# Patient Record
Sex: FEMALE | URBAN - METROPOLITAN AREA
[De-identification: names, ages, dates, MRNs, and addresses within clinical notes are randomized per-mention and may not be internally consistent; named-entity substitution may affect disease eponyms.]

---

## 2023-06-28 ENCOUNTER — HOSPITAL ENCOUNTER (OUTPATIENT)
Facility: CLINIC | Age: 28
Discharge: HOME | End: 2023-06-28
Attending: SPECIALIST | Admitting: SPECIALIST
Payer: COMMERCIAL

## 2023-06-28 ENCOUNTER — HOSPITAL ENCOUNTER (OUTPATIENT)
Facility: CLINIC | Age: 28
End: 2023-06-28
Admitting: SPECIALIST

## 2023-06-28 VITALS — SYSTOLIC BLOOD PRESSURE: 107 MMHG | RESPIRATION RATE: 16 BRPM | DIASTOLIC BLOOD PRESSURE: 59 MMHG | TEMPERATURE: 98.1 F

## 2023-06-28 PROBLEM — Z36.89 ENCOUNTER FOR TRIAGE IN PREGNANT PATIENT: Status: ACTIVE | Noted: 2023-06-28

## 2023-06-28 LAB
ALBUMIN UR-MCNC: NEGATIVE MG/DL
AMPHETAMINES UR QL SCN: NORMAL
APPEARANCE UR: CLEAR
BARBITURATES UR QL SCN: NORMAL
BENZODIAZ UR QL SCN: NORMAL
BILIRUB UR QL STRIP: NEGATIVE
BZE UR QL SCN: NORMAL
CANNABINOIDS UR QL SCN: NORMAL
COLOR UR AUTO: NORMAL
GLUCOSE UR STRIP-MCNC: NEGATIVE MG/DL
HGB UR QL STRIP: NEGATIVE
KETONES UR STRIP-MCNC: NEGATIVE MG/DL
LEUKOCYTE ESTERASE UR QL STRIP: NEGATIVE
NITRATE UR QL: NEGATIVE
OPIATES UR QL SCN: NORMAL
PCP QUAL URINE (ROCHE): NORMAL
PH UR STRIP: 5.5 [PH] (ref 5–7)
SP GR UR STRIP: 1.01 (ref 1–1.03)
UROBILINOGEN UR STRIP-MCNC: NORMAL MG/DL

## 2023-06-28 PROCEDURE — G0463 HOSPITAL OUTPT CLINIC VISIT: HCPCS

## 2023-06-28 PROCEDURE — 80307 DRUG TEST PRSMV CHEM ANLYZR: CPT | Performed by: SPECIALIST

## 2023-06-28 PROCEDURE — 81003 URINALYSIS AUTO W/O SCOPE: CPT | Mod: XU | Performed by: SPECIALIST

## 2023-06-28 PROCEDURE — 87086 URINE CULTURE/COLONY COUNT: CPT | Performed by: SPECIALIST

## 2023-06-28 RX ORDER — LIDOCAINE 40 MG/G
CREAM TOPICAL
Status: DISCONTINUED | OUTPATIENT
Start: 2023-06-28 | End: 2023-06-28 | Stop reason: HOSPADM

## 2023-06-28 ASSESSMENT — ACTIVITIES OF DAILY LIVING (ADL)
ADLS_ACUITY_SCORE: 18
ADLS_ACUITY_SCORE: 18

## 2023-06-28 NOTE — PLAN OF CARE
Kyler presents to [facility] ambulatory unaccompanied at 1805. Patient states reason for assessment is abdominal pain. Pt is a  patient at 16+1 weeks, history is significant for drug abuse (clean since 2023) with heroin and meth, anxiety, and asthma. She is currently registered in a drug treatment center in [place]. She moved from [place] to [place] in the beginning of  and started her prenatal care there. She since has moved into the treatment center in the past week. Her two older children live with family in [place]. She had to have an emergency cerclage placed with her first child at 22 weeks and a routine cerclage at 15 weeks with her 2nd child. Per pt, she states she does not currently need a cerclage this pregnancy but she has an appointment on Friday with perinatology at [facility] to further assess. Her pain began over the past three days. She describes it as sharp intermittent pain that comes and lasts a few seconds, it comes a few times an hour, rating it 3/10. She feels it both in her low abdomen, upper abdomen and vagina. No tenderness when abdomen palpated. Pt denies leaking of fluid or vaginal bleeding. Doptones 145s. Will send urine UA/UC and tox screen. Pt in agreement. Report to Karen to assume care.

## 2023-06-29 NOTE — DISCHARGE INSTRUCTIONS
Understanding Round Ligament Pain in Pregnancy   Round ligament pain is a common problem in pregnancy. Ligaments are strong tissues that connect bones, muscles, and organs. There are 2 round ligaments. There is 1 on each side of the uterus. The top part of each ligament attaches to the upper side of the uterus. The bottom of each ligament attaches down in the pubic area. These ligaments help keep the uterus in place as you move around.     What causes round ligament pain in pregnancy?   As your uterus grows during pregnancy, the round ligaments are stretched and work harder when you move around. They may stretch too quickly when you stand up or bend or laugh. Nearby nerves may be irritated, or the ligaments may have a painful spasm.   Symptoms of ligament pain in pregnancy  The symptoms are sharp pains that last a few seconds. The pain may happen most often on the right side of the belly. It may happen in the hip, the lower belly, or even deep down in your pubic area. The pain may happen when you:   Move suddenly  Stand up  Walk  Roll over in bed  Laugh  Cough  Sneeze  Diagnosing round ligament pain in pregnancy   Your healthcare provider will ask about your symptoms and give you a physical exam. He or she may give you tests to check for other problems that can cause pain, such as an ovarian cyst or enlarged vein (varicocele). He or she will also check for signs of  labor or other pregnancy problems.   Treatment for round ligament pain in pregnancy   To help prevent pain:  Move slowly when you stand up, roll over, turn, or bend.  Don t stand for long periods of time.  Don t lift heavy objects.  Do gentle daily stretches of your hip joints.  When to call your healthcare provider  Call your healthcare provider right away if you have any of these:   Fever of 100.4 F (38 C) or higher  Pains that last more than a few minutes  Pain that gets worse  Bleeding, nausea, vomiting, or other new symptoms  [employer]  last reviewed this educational content on 3/1/2020    9203-3727 [employer]. All rights reserved. This information is not intended as a substitute for professional medical care. Always follow your healthcare professional's instructions.    Discharge Instruction for Undelivered Patients      You were seen for:  Pain  We Consulted: Dr Claire  You had (Test or Medicine):Urine analysis     Diet:   Drink 8 to 12 glasses of liquids (milk, juice, water) every day.  You may eat meals and snacks.     Call your provider if you notice:  Swelling in your face or increased swelling in your hands or legs.  Headaches that are not relieved by Tylenol (acetaminophen).  Changes in your vision (blurring: seeing spots or stars.)  Nausea (sick to your stomach) and vomiting (throwing up).   Weight gain of 5 pounds or more per week.  Heartburn that doesn't go away.  Signs of bladder infection: pain when you urinate (use the toilet), need to go more often and more urgently.  The bag of oswald (rupture of membranes) breaks, or you notice leaking in your underwear.  Bright red blood in your underwear.  Abdominal (lower belly) or stomach pain.    Follow-up:  As scheduled in the clinic

## 2023-06-29 NOTE — PROVIDER NOTIFICATION
06/28/23 2044   Provider Notification   Provider Name/Title Dr Claire   Method of Notification Phone   MD updated on negative UA lab results and drug screen. MD would like to discharge pt home. Order given telephone with read back.     Discharge instructions addressed with pt. Information on round ligament pain given. All questions and concerns addressed. Pt discharged home at 20:55.

## 2023-06-29 NOTE — PROVIDER NOTIFICATION
Spoke with Dr Kyle, reviewed patient situation and assessment. Per MD, TORB for UA/UC and urine tox. Pt agreeable to testing. Will send urine to lab.

## 2023-06-30 LAB — BACTERIA UR CULT: NORMAL
